# Patient Record
Sex: FEMALE | Race: WHITE | NOT HISPANIC OR LATINO | URBAN - METROPOLITAN AREA
[De-identification: names, ages, dates, MRNs, and addresses within clinical notes are randomized per-mention and may not be internally consistent; named-entity substitution may affect disease eponyms.]

---

## 2018-09-28 ENCOUNTER — OFFICE (AMBULATORY)
Dept: URBAN - METROPOLITAN AREA CLINIC 64 | Facility: CLINIC | Age: 38
End: 2018-09-28

## 2018-09-28 VITALS
HEIGHT: 64 IN | WEIGHT: 170 LBS | DIASTOLIC BLOOD PRESSURE: 98 MMHG | SYSTOLIC BLOOD PRESSURE: 149 MMHG | HEART RATE: 73 BPM

## 2018-09-28 DIAGNOSIS — K21.9 GASTRO-ESOPHAGEAL REFLUX DISEASE WITHOUT ESOPHAGITIS: ICD-10-CM

## 2018-09-28 DIAGNOSIS — R11.0 NAUSEA: ICD-10-CM

## 2018-09-28 DIAGNOSIS — K64.9 UNSPECIFIED HEMORRHOIDS: ICD-10-CM

## 2018-09-28 DIAGNOSIS — R10.84 GENERALIZED ABDOMINAL PAIN: ICD-10-CM

## 2018-09-28 DIAGNOSIS — K62.5 HEMORRHAGE OF ANUS AND RECTUM: ICD-10-CM

## 2018-09-28 DIAGNOSIS — R19.5 OTHER FECAL ABNORMALITIES: ICD-10-CM

## 2018-09-28 PROCEDURE — 99244 OFF/OP CNSLTJ NEW/EST MOD 40: CPT | Performed by: NURSE PRACTITIONER

## 2018-10-16 ENCOUNTER — ON CAMPUS - OUTPATIENT (AMBULATORY)
Dept: URBAN - METROPOLITAN AREA HOSPITAL 2 | Facility: HOSPITAL | Age: 38
End: 2018-10-16

## 2018-10-16 ENCOUNTER — OFFICE (AMBULATORY)
Dept: URBAN - METROPOLITAN AREA PATHOLOGY 4 | Facility: PATHOLOGY | Age: 38
End: 2018-10-16

## 2018-10-16 VITALS
OXYGEN SATURATION: 100 % | DIASTOLIC BLOOD PRESSURE: 99 MMHG | TEMPERATURE: 98.4 F | SYSTOLIC BLOOD PRESSURE: 141 MMHG | HEART RATE: 94 BPM | HEART RATE: 87 BPM | SYSTOLIC BLOOD PRESSURE: 125 MMHG | SYSTOLIC BLOOD PRESSURE: 142 MMHG | SYSTOLIC BLOOD PRESSURE: 127 MMHG | OXYGEN SATURATION: 97 % | HEART RATE: 100 BPM | OXYGEN SATURATION: 96 % | DIASTOLIC BLOOD PRESSURE: 81 MMHG | HEART RATE: 90 BPM | SYSTOLIC BLOOD PRESSURE: 106 MMHG | WEIGHT: 169 LBS | HEART RATE: 110 BPM | SYSTOLIC BLOOD PRESSURE: 130 MMHG | HEIGHT: 64 IN | HEART RATE: 102 BPM | DIASTOLIC BLOOD PRESSURE: 89 MMHG | DIASTOLIC BLOOD PRESSURE: 84 MMHG | DIASTOLIC BLOOD PRESSURE: 58 MMHG | SYSTOLIC BLOOD PRESSURE: 124 MMHG | HEART RATE: 74 BPM | DIASTOLIC BLOOD PRESSURE: 74 MMHG | OXYGEN SATURATION: 98 % | HEART RATE: 78 BPM | RESPIRATION RATE: 16 BRPM | DIASTOLIC BLOOD PRESSURE: 105 MMHG | SYSTOLIC BLOOD PRESSURE: 121 MMHG | RESPIRATION RATE: 18 BRPM | DIASTOLIC BLOOD PRESSURE: 76 MMHG | OXYGEN SATURATION: 99 %

## 2018-10-16 DIAGNOSIS — K29.50 UNSPECIFIED CHRONIC GASTRITIS WITHOUT BLEEDING: ICD-10-CM

## 2018-10-16 DIAGNOSIS — K29.70 GASTRITIS, UNSPECIFIED, WITHOUT BLEEDING: ICD-10-CM

## 2018-10-16 DIAGNOSIS — K64.4 RESIDUAL HEMORRHOIDAL SKIN TAGS: ICD-10-CM

## 2018-10-16 DIAGNOSIS — K29.80 DUODENITIS WITHOUT BLEEDING: ICD-10-CM

## 2018-10-16 DIAGNOSIS — K21.9 GASTRO-ESOPHAGEAL REFLUX DISEASE WITHOUT ESOPHAGITIS: ICD-10-CM

## 2018-10-16 DIAGNOSIS — K57.30 DIVERTICULOSIS OF LARGE INTESTINE WITHOUT PERFORATION OR ABS: ICD-10-CM

## 2018-10-16 DIAGNOSIS — R19.5 OTHER FECAL ABNORMALITIES: ICD-10-CM

## 2018-10-16 DIAGNOSIS — R11.0 NAUSEA: ICD-10-CM

## 2018-10-16 DIAGNOSIS — K62.5 HEMORRHAGE OF ANUS AND RECTUM: ICD-10-CM

## 2018-10-16 LAB
GI HISTOLOGY: A. SELECT: (no result)
GI HISTOLOGY: B. SELECT: (no result)
GI HISTOLOGY: PDF REPORT: (no result)

## 2018-10-16 PROCEDURE — 88342 IMHCHEM/IMCYTCHM 1ST ANTB: CPT | Performed by: INTERNAL MEDICINE

## 2018-10-16 PROCEDURE — 88305 TISSUE EXAM BY PATHOLOGIST: CPT | Performed by: INTERNAL MEDICINE

## 2018-10-16 PROCEDURE — 43239 EGD BIOPSY SINGLE/MULTIPLE: CPT | Mod: 59 | Performed by: INTERNAL MEDICINE

## 2018-10-16 PROCEDURE — 45378 DIAGNOSTIC COLONOSCOPY: CPT | Performed by: INTERNAL MEDICINE

## 2018-10-16 RX ORDER — PANTOPRAZOLE SODIUM 40 MG/1
40 TABLET, DELAYED RELEASE ORAL
Qty: 90 | Refills: 3 | Status: ACTIVE
Start: 2018-10-16

## 2018-12-19 ENCOUNTER — HOSPITAL ENCOUNTER (OUTPATIENT)
Dept: MAMMOGRAPHY | Facility: HOSPITAL | Age: 38
Discharge: HOME | End: 2018-12-19
Attending: NURSE PRACTITIONER | Admitting: NURSE PRACTITIONER

## 2019-01-02 ENCOUNTER — HOSPITAL ENCOUNTER (OUTPATIENT)
Dept: MAMMOGRAPHY | Facility: HOSPITAL | Age: 39
Discharge: HOME | End: 2019-01-02
Attending: NURSE PRACTITIONER | Admitting: NURSE PRACTITIONER

## 2019-08-21 ENCOUNTER — TRANSCRIBE ORDERS (OUTPATIENT)
Dept: ADMINISTRATIVE | Facility: HOSPITAL | Age: 39
End: 2019-08-21

## 2019-08-21 DIAGNOSIS — N63.0 LUMP OR MASS IN BREAST: Primary | ICD-10-CM

## 2019-08-30 ENCOUNTER — APPOINTMENT (OUTPATIENT)
Dept: MAMMOGRAPHY | Facility: HOSPITAL | Age: 39
End: 2019-08-30

## 2019-08-30 ENCOUNTER — APPOINTMENT (OUTPATIENT)
Dept: ULTRASOUND IMAGING | Facility: HOSPITAL | Age: 39
End: 2019-08-30

## 2022-06-02 ENCOUNTER — TRANSCRIBE ORDERS (OUTPATIENT)
Dept: PHYSICAL THERAPY | Facility: CLINIC | Age: 42
End: 2022-06-02

## 2022-06-02 ENCOUNTER — TELEPHONE (OUTPATIENT)
Dept: PHYSICAL THERAPY | Facility: CLINIC | Age: 42
End: 2022-06-02

## 2022-06-02 DIAGNOSIS — M54.50 LOW BACK PAIN, UNSPECIFIED BACK PAIN LATERALITY, UNSPECIFIED CHRONICITY, UNSPECIFIED WHETHER SCIATICA PRESENT: Primary | ICD-10-CM

## 2022-06-07 ENCOUNTER — TRANSCRIBE ORDERS (OUTPATIENT)
Dept: PHYSICAL THERAPY | Facility: CLINIC | Age: 42
End: 2022-06-07

## 2022-06-07 DIAGNOSIS — M54.50 LUMBAR PAIN: ICD-10-CM

## 2022-06-07 DIAGNOSIS — M25.559 PAIN, HIP: Primary | ICD-10-CM

## 2022-06-15 ENCOUNTER — TREATMENT (OUTPATIENT)
Dept: PHYSICAL THERAPY | Facility: CLINIC | Age: 42
End: 2022-06-15

## 2022-06-15 DIAGNOSIS — M54.50 CHRONIC MIDLINE LOW BACK PAIN WITHOUT SCIATICA: Primary | ICD-10-CM

## 2022-06-15 DIAGNOSIS — M25.551 RIGHT HIP PAIN: ICD-10-CM

## 2022-06-15 DIAGNOSIS — G89.29 CHRONIC MIDLINE LOW BACK PAIN WITHOUT SCIATICA: Primary | ICD-10-CM

## 2022-06-15 PROCEDURE — 97162 PT EVAL MOD COMPLEX 30 MIN: CPT | Performed by: PHYSICAL THERAPIST

## 2022-06-15 PROCEDURE — 97110 THERAPEUTIC EXERCISES: CPT | Performed by: PHYSICAL THERAPIST

## 2022-06-15 PROCEDURE — 97140 MANUAL THERAPY 1/> REGIONS: CPT | Performed by: PHYSICAL THERAPIST

## 2022-06-15 NOTE — PROGRESS NOTES
Physical Therapy Initial Evaluation and Plan of Care    Patient: Petra Cantu   : 1980  Diagnosis/ICD-10 Code:  Chronic midline low back pain without sciatica [M54.50, G89.29]  Referring practitioner: YARIEL Harding*  Date of Initial Visit: 6/15/2022  Today's Date: 6/15/2022  Patient seen for 1 sessions           Subjective Questionnaire: HELGA       Subjective Evaluation    History of Present Illness  Mechanism of injury: CHIEF C/O   Pain into R posterior hip area with the current episode  CURRENT EPISODE   She reports that she has had ongoing, intermittent upper lumbar pain since .  No prior PT   States that she has lost 60# since 2021.  By Oct she had lost the first 40#.  She report getting sick with a GI bug in March and lost another 20#.  Currently, her PCP is running bloodwork.  She also reports being SOB and elevated HR during daily tasks within the house  ie steps.     Regarding the back, she can tell when the incident is about to happen.  On onset of the lumbar pain she must walk with a flexed back for several day.  States the R hip pain is unchanged from onset.     ONSET   R hip pain approx 3-4 month  LOCATION   Posterior R hip  DESCRIPTION:  Uncomfortable,  Increased pain with walking.   PAIN LEVELS:2-7/10  1ST AM:   Stiff in the whole body  TIME OF DAY:   No change with TOD  BEST:   Sit, not moving   WORSE:   Walking.  No change with initial stance from sit.   SLEEP:   Difficult to find a comfortable position and the hip ( and all jts) make her change to another positron.   EX PROGRAM/ACTIVITES   Prior to getting sick she was going to the gym 3x per week doing some cardio and upper body and lower body ex.   PAST MEDICAL HISTORY:        (-) Pregnancy, pacemaker, DM, CA, latex allergy, metal implants  SURGICAL HISTORY:          Patient Occupation: AR processor.  sit down job.  Quality of life: excellent    Social Support  Lives with: spouse    Hand dominance: right              Objective          Postural Observations    Additional Postural Observation Details  Mild R posterior PSIS and slightly higher R skin fold. Reduced lumbar lordosis.     Palpation     Right Tenderness of the gluteus medius.   Trigger point to gluteus medius.     Tenderness     Right Hip   Tenderness in the PSIS and greater trochanter.     Neurological Testing     Sensation     Lumbar   Left   Intact: light touch    Right   Intact: light touch    Reflexes   Left   Patellar (L4): normal (2+)  Achilles (S1): normal (2+)    Right   Patellar (L4): normal (2+)  Achilles (S1): normal (2+)    Active Range of Motion     Lumbar   Flexion: 52 degrees   Extension: 0 degrees with pain  Left lateral flexion: 15 degrees with pain  Right lateral flexion: 10 degrees   Left rotation: 30 degrees   Right rotation: 30 degrees     Right Hip   Flexion: WFL  Extension: WFL  Abduction: WFL  Adduction: WFL  External rotation (90/90): 54 degrees   External rotation (prone): 82 degrees   Internal rotation (90/90): 48 degrees     Additional Active Range of Motion Details  Ext:  1x = reps and significant pain  LSB increased on R PSIS  Pain with hip ER    Passive Range of Motion     Additional Passive Range of Motion Details  PIVM:  Lumbar 2+ salas facet opening.  PA 1- with point tenderness @ L1L2L#    Strength/Myotome Testing     Additional Strength Details  Ankle:  5/5 salas  Knee flex/ext 5/5 salas  Hip:  Flex 5-,  Hip abd 4- R with pain,  Ext 4- salas, Hip ER 4- with mild pain on R,   IR salas 4+ and ER 4+ wo pain.    Tests       Thoracic   Negative slump.     Lumbar   Positive repeated extension.     Left   Negative crossed SLR.     Right Hip   Positive FADIR.     Right Hip Flexibility Comments:   Normal flexibility with exception of R hip ER and piriformis          Assessment & Plan     Assessment  Impairments: abnormal or restricted ROM, activity intolerance, impaired physical strength, lacks appropriate home exercise program and pain with  function  Functional Limitations: lifting, uncomfortable because of pain, sitting and standing  Assessment details: Petra Cantu is a 42 y.o. yr old female referred to PT due to lumbar and R hip pain.   Petra reports the R hip onset of 3+ months ago with greatest pain during wt bearing and walking.  The lumbar pain has been on/off for since  with episodes of sudden onset.  Patient was seen in the clinic today for the initial evaluation.  The evidence R hip impingement signs with hip IR/ER tightness and lumbar dysfunction including hypermobility, core weakness.  Petra Cantu would benefit from outpatient PT in order to achieve the stated goals and maximal functional capacity.  The HELGA functional survey score of 13/50 was noted on the eval date.     Eval complexity:  Moderate due to # areas assessed, medical history, evolving and decision making  Prognosis: good    Goals  Plan Goals: Patient goal:  Get strong and return to the gym.  ST visits     1)  Pain levels  0-5/10     2)  Increase trunk ROM by 30%     3)  Patient will tolerate standing/ walking  up to >10  min intrevals     4)  Patient will report =/>30% improvement in sleep relating to the pain.       LTG:  DC     1)  Patient will complete pain free trunk ROM in order to complete dressing, bathing and simple movement transitions, ADLs      2)  Sleep will not be affected by lumbar or R hip pain     3)  Patient will be able to complete household tasks in standing wo pain      4)  Improve HELGA score =/> 9 points ( eval HELGA 13/50)     5)  Independent in advance and final HEP for management at home      6)  pnt to resume walking wo R hip pain.     Plan  Therapy options: will be seen for skilled therapy services  Planned modality interventions: cryotherapy, high voltage pulsed current (pain management), traction, thermotherapy (hydrocollator packs), ultrasound and dry needling  Planned therapy interventions: abdominal trunk stabilization, body  mechanics training, flexibility, home exercise program, manual therapy, neuromuscular re-education, soft tissue mobilization, spinal/joint mobilization, stretching, strengthening, therapeutic activities, postural training and balance/weight-bearing training  Frequency: 2x week  Duration in weeks: 13  Treatment plan discussed with: patient  Plan details: Plan of care reviewed with patient and agreed.   Instructions of anatomy and role of flexibility, mobility and strength were completed.  Neutral spine posture also instructed.        Timed:         Manual Therapy:    9     mins  05951;     Therapeutic Exercise:    11     mins  69148;     Neuromuscular Afsaneh:        mins  90496;    Therapeutic Activity:          mins  81821;     Gait Training:           mins  97738;     Ultrasound:          mins  89414;    Ionto                                   mins   23167  Self Care                       4     mins   93697  Canalith Repos         mins 34661      Un-Timed:  Electrical Stimulation:         mins  25260 ( );  Dry Needling          mins self-pay  Traction          mins 44319  Low Eval          Mins  27332  Mod Eval     25     Mins  19593  High Eval                            Mins  78833  Re-Eval                               mins  44547        Timed Treatment:  24    mins   Total Treatment:     49   mins    PT SIGNATURE: Kayla Jackson PT   DATE TREATMENT INITIATED: 6/15/2022    Initial Certification  Certification Period: 9/13/2022  I certify that the therapy services are furnished while this patient is under my care.  The services outlined above are required by this patient, and will be reviewed every 90 days.     PHYSICIAN: Leta Murray, APRN      DATE:     Please sign and return via fax to [phone].. Thank you, [facility].

## 2022-06-23 ENCOUNTER — TREATMENT (OUTPATIENT)
Dept: PHYSICAL THERAPY | Facility: CLINIC | Age: 42
End: 2022-06-23

## 2022-06-23 DIAGNOSIS — M54.50 CHRONIC MIDLINE LOW BACK PAIN WITHOUT SCIATICA: Primary | ICD-10-CM

## 2022-06-23 DIAGNOSIS — G89.29 CHRONIC MIDLINE LOW BACK PAIN WITHOUT SCIATICA: Primary | ICD-10-CM

## 2022-06-23 DIAGNOSIS — M25.551 RIGHT HIP PAIN: ICD-10-CM

## 2022-06-23 PROCEDURE — 97110 THERAPEUTIC EXERCISES: CPT | Performed by: PHYSICAL THERAPIST

## 2022-06-23 PROCEDURE — 97140 MANUAL THERAPY 1/> REGIONS: CPT | Performed by: PHYSICAL THERAPIST

## 2022-06-23 NOTE — PROGRESS NOTES
Physical Therapy Daily Progress Note        Patient: Petra Cantu   : 1980  Diagnosis/ICD-10 Code:  Chronic midline low back pain without sciatica [M54.50, G89.29]  Referring practitioner: YARIEL Harding*  Date of Initial Visit: Type: THERAPY  Noted: 6/15/2022  Today's Date: 2022  Patient seen for 2 sessions         Petra Cantu reports: she has been doing the stretches especially the one with laying on her stomach and it feels like her back is going to go out.         Subjective     Objective   See Exercise, Manual, and Modality Logs for complete treatment.       Assessment/Plan  Pt reported relief with stretches, joint mobs, and LAD.  Progressed stretching and core strengthening this date and updated HEP to reflect changes. Pt verbalized understanding and demonstrated good technique with exercises.  Will monitor tolerance to changes and progress next session if able.     Progress per Plan of Care           Manual Therapy:    8     mins  61901;  Therapeutic Exercise:    32     mins  89802;     Neuromuscular Afsaneh:        mins  98196;    Therapeutic Activity:          mins  91600;     Gait Training:           mins  20251;     Ultrasound:          mins  69839;    Electrical Stimulation:         mins  01197 ( );  Dry Needling          mins self-pay    Timed Treatment:   40   mins   Total Treatment:     40   mins    Nicky Lam PTA  Physical Therapist Assistant

## 2022-06-23 NOTE — PATIENT INSTRUCTIONS
Access Code: [email or web address]  URL: [email or web address]  Date: 06/23/2022  Prepared by: Nicky Lam    Exercises  Supine Single Knee to Chest Stretch - 2 x daily - 7 x weekly - 1 sets - 3 reps - 20 hold  Supine Lower Trunk Rotation - 2 x daily - 7 x weekly - 1 sets - 5 reps - 10 hold  Supine Hamstring Stretch - 2 x daily - 7 x weekly - 1 sets - 3 reps - 20 hold  Seated Piriformis Stretch with Trunk Bend - 2 x daily - 7 x weekly - 1 sets - 3 reps - 20 hold  Supine Posterior Pelvic Tilt - 1 x daily - 7 x weekly - 1 sets - 10 reps - 5 hold  Cat Cow - 1 x daily - 7 x weekly - 1 sets - 10 reps  Supine March - 1 x daily - 7 x weekly - 1 sets - 10 reps  Bent Knee Fallouts - 1 x daily - 7 x weekly - 1 sets - 10 reps

## 2022-06-28 ENCOUNTER — TREATMENT (OUTPATIENT)
Dept: PHYSICAL THERAPY | Facility: CLINIC | Age: 42
End: 2022-06-28

## 2022-06-28 DIAGNOSIS — M54.50 CHRONIC MIDLINE LOW BACK PAIN WITHOUT SCIATICA: Primary | ICD-10-CM

## 2022-06-28 DIAGNOSIS — M25.551 RIGHT HIP PAIN: ICD-10-CM

## 2022-06-28 DIAGNOSIS — G89.29 CHRONIC MIDLINE LOW BACK PAIN WITHOUT SCIATICA: Primary | ICD-10-CM

## 2022-06-28 PROCEDURE — 97110 THERAPEUTIC EXERCISES: CPT | Performed by: PHYSICAL THERAPIST

## 2022-06-28 PROCEDURE — 97112 NEUROMUSCULAR REEDUCATION: CPT | Performed by: PHYSICAL THERAPIST

## 2022-06-28 PROCEDURE — 97140 MANUAL THERAPY 1/> REGIONS: CPT | Performed by: PHYSICAL THERAPIST

## 2022-06-28 NOTE — PROGRESS NOTES
Physical Therapy Daily Progress Note    VISIT#: 3    Subjective   Petra Cantu reports that she is feeling much better.  Hardly any pain in the hip and no back pain.   Found out that she sits on the couch with the legs in a left side sit position.        Objective   R SLS wo pain.   R hip abd - active wo pain,  Resisted with mild pain.  Palpation:  Mild thickening and pain at R TFL and 2 handsbreath distal of the R leg.    Trunk ext:  5 degrees with pain in the back.    See Exercise, Manual, and Modality Logs for complete treatment.     Patient Education:  Review the definition and position of neutral spine posture.   Added the march ex to single leg vs alternating.       Assessment/Plan  Petra has progress with her pain level and motion of trunk and R hip.   There remains some IR anteromedial impingement signs.   Core ex added for home HEP.        Patient goal:  Get strong and return to the gym.  ST visits     1)  Pain levels  0-5/10     2)  Increase trunk ROM by 30%     3)  Patient will tolerate standing/ walking  up to >10  min intrevals     4)  Patient will report =/>30% improvement in sleep relating to the pain.       LTG:  DC     1)  Patient will complete pain free trunk ROM in order to complete dressing, bathing and simple movement transitions, ADLs      2)  Sleep will not be affected by lumbar or R hip pain     3)  Patient will be able to complete household tasks in standing wo pain      4)  Improve HELGA score =/> 9 points ( al HELGA 13/50)     5)  Independent in advance and final HEP for management at home      6)  pnt to resume walking wo R hip pain  .   Plan:   pnt is on vacation until 22.    Cont with the same plan as indicated.             Timed:         Manual Therapy:    11     mins  46838;     Therapeutic Exercise:    18     mins  43381;     Neuromuscular Afsaneh:    13    mins  59263;    Therapeutic Activity:          mins  35762;     Gait Training:           mins  07086;     Ultrasound:           mins  80751;    Ionto                                   mins   51607  Self Care                       4     mins   65280  Canalith Repos                   mins  78368    Un-Timed:  Electrical Stimulation:         mins  73235 ( );  Dry Needling          mins self-pay  Traction          mins 16719  Low Eval          Mins  82316  Mod Eval          Mins  51328  High Eval                            Mins  99174  Re-Eval                               mins  28646    Timed Treatment:   46   mins   Total Treatment:     46   mins    Kayla Jackson PT    Physical Therapist

## 2022-07-12 ENCOUNTER — TELEPHONE (OUTPATIENT)
Dept: PHYSICAL THERAPY | Facility: CLINIC | Age: 42
End: 2022-07-12